# Patient Record
Sex: FEMALE | Race: BLACK OR AFRICAN AMERICAN | Employment: FULL TIME | URBAN - METROPOLITAN AREA
[De-identification: names, ages, dates, MRNs, and addresses within clinical notes are randomized per-mention and may not be internally consistent; named-entity substitution may affect disease eponyms.]

---

## 2019-04-28 ENCOUNTER — HOSPITAL ENCOUNTER (EMERGENCY)
Facility: CLINIC | Age: 54
Discharge: HOME | End: 2019-04-29
Attending: NURSE PRACTITIONER | Admitting: NURSE PRACTITIONER
Payer: COMMERCIAL

## 2019-04-28 DIAGNOSIS — R42 DIZZINESS: ICD-10-CM

## 2019-04-28 DIAGNOSIS — H81.09 MENIERE'S DISEASE, UNSPECIFIED LATERALITY: ICD-10-CM

## 2019-04-28 DIAGNOSIS — R11.2 NAUSEA AND VOMITING, INTRACTABILITY OF VOMITING NOT SPECIFIED, UNSPECIFIED VOMITING TYPE: ICD-10-CM

## 2019-04-28 PROCEDURE — 96374 THER/PROPH/DIAG INJ IV PUSH: CPT

## 2019-04-28 PROCEDURE — 25000128 H RX IP 250 OP 636: Performed by: PHYSICIAN ASSISTANT

## 2019-04-28 PROCEDURE — 99284 EMERGENCY DEPT VISIT MOD MDM: CPT | Mod: 25

## 2019-04-28 PROCEDURE — 96375 TX/PRO/DX INJ NEW DRUG ADDON: CPT

## 2019-04-28 PROCEDURE — 80048 BASIC METABOLIC PNL TOTAL CA: CPT | Performed by: PHYSICIAN ASSISTANT

## 2019-04-28 PROCEDURE — 25000132 ZZH RX MED GY IP 250 OP 250 PS 637: Performed by: PHYSICIAN ASSISTANT

## 2019-04-28 PROCEDURE — 96361 HYDRATE IV INFUSION ADD-ON: CPT

## 2019-04-28 RX ORDER — DIPHENHYDRAMINE HYDROCHLORIDE 50 MG/ML
25 INJECTION INTRAMUSCULAR; INTRAVENOUS EVERY 6 HOURS PRN
Status: DISCONTINUED | OUTPATIENT
Start: 2019-04-28 | End: 2019-04-29 | Stop reason: HOSPADM

## 2019-04-28 RX ORDER — MECLIZINE HYDROCHLORIDE 25 MG/1
25 TABLET ORAL ONCE
Status: COMPLETED | OUTPATIENT
Start: 2019-04-28 | End: 2019-04-28

## 2019-04-28 RX ORDER — DIPHENHYDRAMINE HCL 25 MG
25 CAPSULE ORAL EVERY 6 HOURS PRN
Status: DISCONTINUED | OUTPATIENT
Start: 2019-04-28 | End: 2019-04-29 | Stop reason: HOSPADM

## 2019-04-28 RX ORDER — METOCLOPRAMIDE HYDROCHLORIDE 5 MG/ML
10 INJECTION INTRAMUSCULAR; INTRAVENOUS ONCE
Status: COMPLETED | OUTPATIENT
Start: 2019-04-28 | End: 2019-04-28

## 2019-04-28 RX ADMIN — DIPHENHYDRAMINE HYDROCHLORIDE 25 MG: 50 INJECTION, SOLUTION INTRAMUSCULAR; INTRAVENOUS at 23:01

## 2019-04-28 RX ADMIN — MECLIZINE HYDROCHLORIDE 25 MG: 25 TABLET ORAL at 23:56

## 2019-04-28 RX ADMIN — METOCLOPRAMIDE 10 MG: 5 INJECTION, SOLUTION INTRAMUSCULAR; INTRAVENOUS at 23:00

## 2019-04-28 RX ADMIN — SODIUM CHLORIDE 1000 ML: 9 INJECTION, SOLUTION INTRAVENOUS at 23:00

## 2019-04-28 NOTE — ED AVS SNAPSHOT
[facility] Emergency Department  [address]  [facility] [address]  Phone:  [phone]  Fax:  [phone]                                    Lizeth Muller   MRN: [id number]    Department:  [facility] Emergency Department   Date of Visit:  4/28/2019           After Visit Summary Signature Page    I have received my discharge instructions, and my questions have been answered. I have discussed any challenges I see with this plan with the nurse or doctor.    ..........................................................................................................................................  Patient/Patient Representative Signature      ..........................................................................................................................................  Patient Representative Print Name and Relationship to Patient    ..................................................               ................................................  Date                                   Time    ..........................................................................................................................................  Reviewed by Signature/Title    ...................................................              ..............................................  Date                                               Time          22EPIC Rev 08/18

## 2019-04-29 VITALS
HEART RATE: 73 BPM | RESPIRATION RATE: 20 BRPM | SYSTOLIC BLOOD PRESSURE: 146 MMHG | OXYGEN SATURATION: 99 % | DIASTOLIC BLOOD PRESSURE: 92 MMHG | TEMPERATURE: 98.6 F

## 2019-04-29 LAB
ANION GAP SERPL CALCULATED.3IONS-SCNC: 7 MMOL/L (ref 3–14)
BUN SERPL-MCNC: 11 MG/DL (ref 7–30)
CALCIUM SERPL-MCNC: 8.5 MG/DL (ref 8.5–10.1)
CHLORIDE SERPL-SCNC: 104 MMOL/L (ref 94–109)
CO2 SERPL-SCNC: 25 MMOL/L (ref 20–32)
CREAT SERPL-MCNC: 0.68 MG/DL (ref 0.52–1.04)
GFR SERPL CREATININE-BSD FRML MDRD: >90 ML/MIN/{1.73_M2}
GLUCOSE SERPL-MCNC: 137 MG/DL (ref 70–99)
INTERPRETATION ECG - MUSE: NORMAL
POTASSIUM SERPL-SCNC: 3.1 MMOL/L (ref 3.4–5.3)
SODIUM SERPL-SCNC: 136 MMOL/L (ref 133–144)

## 2019-04-29 ASSESSMENT — ENCOUNTER SYMPTOMS
SHORTNESS OF BREATH: 0
RHINORRHEA: 0
CHILLS: 0
NUMBNESS: 0
DIZZINESS: 1
WEAKNESS: 0
FEVER: 0
SORE THROAT: 0

## 2019-04-29 NOTE — ED PROVIDER NOTES
Emergency Department Attending Supervision Note  4/28/2019  11:14 PM      I evaluated this patient in conjunction with Teofilo MCKEON    Briefly, the patient presented with what appears to be atypical flare of her Ménière's disease and vertigo.  She had been using her usual remedies at home over the last week but has been unable to control them over the last 3 hours therefore she presented for evaluation.  Vomiting as a result of her dizziness and was unable to keep her usual medications down.  She has had no fevers, vomiting, chest pain, shortness of breath, or other concerns.      On my exam:  General: Alert, No obvious discomfort (post treatment), well kept  Eyes: PERRL, conjunctivae pink no scleral icterus or conjunctival injection  ENT:   Moist mucus membranes, posterior oropharynx clear without erythema or exudates, No lymphadenopathy, Normal voice  Resp:  Lungs clear to auscultation bilaterally, no crackles/rubs/wheezes. Good air movement  CV:  Normal rate and rhythm, no murmurs/rubs/gallops  GI:  Abdomen soft and non-distended.  Normoactive BS.  No tenderness, guarding or rebound, No masses  Skin:  Warm, dry.  No rashes or petechiae  Musculoskeletal: No peripheral edema or calf tenderness, Normal gross ROM   Neuro: Alert and oriented to person/place/time, normal sensation  Psychiatric: Normal affect, cooperative, good eye contact    Recent Results (from the past 8 hour(s))   EKG 12 lead    Collection Time: 04/28/19 10:21 PM   Result Value Ref Range    Interpretation ECG Click View Image link to view waveform and result    Basic metabolic panel (BMP)    Collection Time: 04/28/19 10:30 PM   Result Value Ref Range    Sodium 136 133 - 144 mmol/L    Potassium 3.1 (L) 3.4 - 5.3 mmol/L    Chloride 104 94 - 109 mmol/L    Carbon Dioxide 25 20 - 32 mmol/L    Anion Gap 7 3 - 14 mmol/L    Glucose 137 (H) 70 - 99 mg/dL    Urea Nitrogen 11 7 - 30 mg/dL    Creatinine 0.68 0.52 - 1.04 mg/dL    GFR Estimate >90 >60  mL/min/[1.73_m2]    GFR Estimate If Black >90 >60 mL/min/[1.73_m2]    Calcium 8.5 8.5 - 10.1 mg/dL     Patient's symptoms were completely resolved after treatment.  Ambulation around the department without difficulty.  No focal neurologic deficits    Diagnosis    ICD-10-CM    1. Dizziness R42 Basic metabolic panel (BMP)   2. Nausea and vomiting, intractability of vomiting not specified, unspecified vomiting type R11.2    3. Meniere's disease, unspecified laterality H81.09          Nam Thomas 4/28/2019 11:14 PM     Nam Ovalle, APRN CNP  04/29/19 0100

## 2019-04-29 NOTE — DISCHARGE INSTRUCTIONS
Follow up with Abner Marie PA-C within the next week for recheck.   Meclizine (Antivert) from local drug store.

## 2019-04-29 NOTE — ED PROVIDER NOTES
History     Chief Complaint:  Dizziness, Nausea, and Vomiting     HPI   Lizeth Muller is a 53 year old female with a history of Meniere disease who presents to the ED with dizziness, nausea, and vomiting.  Patient reports onset of dizziness, tinnitus, and vomiting beginning approximately 1 week ago.  States that she has been taking Valium 2 mg and Zofran ODT for her symptoms with moderate relief.  However, she states that approximately 3 hours ago she had significant worsening of her symptoms.  She states that she tried taking Valium x2, but vomited both of these up.  She denies any fevers, chills, chest pain, shortness of breath, vision changes.  She denies any hospitalizations.  She notes her ENT is Abner Marie PA-C.    Allergies:  Ceftin [Cefuroxime Axetil]  Ferrous Sulfate     Medications:      amLODIPine (NORVASC) 10 MG tablet   atenolol (TENORMIN) 25 MG tablet   ferrous sulfate (SLO-FE) 142 (45 FE) MG TBCR   gabapentin (NEURONTIN) 100 MG tablet   HYDROmorphone (DILAUDID) 2 MG tablet   Multiple Vitamin (MULTIVITAMIN OR)   tramadol (ULTRAM) 50 MG tablet   ValACYclovir (VALTREX) 500 MG tablet   vitamin E 400 UNIT capsule       Past Medical History:    Past Medical History:   Diagnosis Date     Menorrhagia      Menorrhagia      Restless leg syndrome      Uterine leiomyoma - fibroids      Uterine leiomyoma - fibroids    Meniere Disease    There are no active problems to display for this patient.       Past Surgical History:    Past Surgical History:   Procedure Laterality Date     GYN SURGERY      tubal ligation, cesarian sec x2     LAPAROSCOPIC HYSTERECTOMY SUPRACERVICAL  1/13/2012    Procedure:LAPAROSCOPIC HYSTERECTOMY SUPRACERVICAL; LAPAROSCOPIC SUPRACERVICAL HYSTERECTOMY; Surgeon:JENNIFER LUIS; Location: OR        Family History:    family history is not on file.    Social History:   reports that she has never smoked. She does not have any smokeless tobacco history on file. She reports that she  drinks alcohol. She reports that she does not use drugs.    PCP: [facility], [place]     Review of Systems   Constitutional: Negative for chills and fever.   HENT: Positive for tinnitus. Negative for ear pain, rhinorrhea and sore throat.    Eyes: Positive for visual disturbance.   Respiratory: Negative for shortness of breath.    Cardiovascular: Negative for chest pain.   Neurological: Positive for dizziness. Negative for syncope, weakness and numbness.   All other systems reviewed and are negative.    Physical Exam     Patient Vitals for the past 24 hrs:   BP Temp Pulse Resp SpO2   04/29/19 0000 (!) 146/92 -- 73 -- --   04/28/19 2209 139/89 98.6  F (37  C) 82 20 99 %        Physical Exam  Constitutional: Appears uncomfortable, unable to look at me initially due to dizziness.  Eyes: Pupils equally round and reactive. No nystagmus.  HENT: Head is normal in appearance. Oropharynx is normal with moist mucus membranes.  Cardiovascular: Regular rate and rhythm and without murmurs.  Respiratory: Normal respiratory effort, lungs are clear bilaterally.  GI: Abdomen is soft, non-tender, non-distended. No guarding, rebound, or rigidity.  Skin: Normal, without rash.  Neurologic: Cranial nerves II-XII intact, nl cognition, no focal deficits. Alert and oriented x 3. Normal  strength. Normal leg raise. Sensation to light touch intact throughout all 4 extremities. 5/5 strength with dorsiflexion and plantarflexion bilaterally. No pronator drift. Normal finger nose finger.   Psychiatric: Normal affect.  Nursing notes and vital signs reviewed.    Emergency Department Course     Laboratory:  Labs Ordered and Resulted from Time of ED Arrival Up to the Time of Departure from the ED   BASIC METABOLIC PANEL - Abnormal; Notable for the following components:       Result Value    Potassium 3.1 (*)     Glucose 137 (*)     All other components within normal limits        Interventions:  Medications   diphenhydrAMINE (BENADRYL)  capsule 25 mg ( Oral See Alternative 4/28/19 2301)     Or   diphenhydrAMINE (BENADRYL) injection 25 mg (25 mg Intravenous Given 4/28/19 2301)   metoclopramide (REGLAN) injection 10 mg (10 mg Intravenous Given 4/28/19 2300)   meclizine (ANTIVERT) tablet 25 mg (25 mg Oral Given 4/28/19 2356)   0.9% sodium chloride BOLUS (0 mLs Intravenous Stopped 4/29/19 0007)        Emergency Department Course:  Past medical records, nursing notes, and vitals reviewed.  I performed an exam of the patient and obtained history, as documented above.  I ordered the above interventions.  1205 Patient reevaluated. She reports feeling much improved after the Reglan.  0100 Patient reevaluated. She reports feeling back at baseline.  Patient discharged.    Impression & Plan      Medical Decision Making:  Lizeth Muller is a 53 year old female with a history of Meniere disease who presents to the ED with dizziness, nausea, and vomiting.  Patient reports onset of dizziness, tinnitus, and vomiting beginning approximately 1 week ago.  States that she has been taking Valium 2 mg and Zofran ODT for her symptoms with moderate relief.  However, she states that approximately 3 hours ago she had significant worsening of her symptoms.  She states that she tried taking Valium x2, but vomited both of these up.  See above for additional details.    On my evaluation patient's vital signs are within normal limits.  On physical exam, patient is quite uncomfortable appearing; in light of patient's known Meniere's disease, neuro exam was deferred secondary to patient's severe nausea, vomiting, and dizziness.  After administration of Reglan and Benadryl for nausea and vomiting, as well as initiation of IV fluids, neuro exam was performed, and was completely non-focal.  Patient reports feeling back to baseline following administration of Reglan and meclizine. She was ambulated in the ED without issue. Patient advised to follow up with her ENT within the next  week.  Patient was discharged home with her partner in stable condition.    Diagnosis:    ICD-10-CM    1. Dizziness R42 Basic metabolic panel (BMP)   2. Nausea and vomiting, intractability of vomiting not specified, unspecified vomiting type R11.2    3. Meniere's disease, unspecified laterality H81.09         Discharge Medications:     Medication List      There are no discharge medications for this visit.          Teofilo Pressley PA-C  [facility] ED  April 29, 2019       Teofilo Pressley PA-C  04/29/19 0059